# Patient Record
(demographics unavailable — no encounter records)

---

## 2018-01-01 NOTE — PCM.DS
Discharge Summary


Date of Admission: 


18 18:16





Admitting Physician: 


YOSEPH MONTERO





Primary Care Provider: 


YOSEPH MONTERO








Hospital Summary





- Hospital Course


Hospital Course: 





Pt born to  mom at term, , meconium stained fluid and D-Meliton suctioned at 

birth but no further complications. Mom's pregnancy late to care and opiate use 

disorder.  Positive for amphetamines at hospital admission. Baby's meconium 

screen is pending.  CPS is involved.  Baby eating and urinating well; no 

appreciable weight loss.





- Vitals & Intake/Output


Vital Signs: 





 Vital Signs











Temperature  98.6 F   18 01:08


 


Pulse Rate  120 L  18 01:08


 


Respiratory Rate  81   18 01:08


 


Blood Pressure  67/26   18 00:00


 


O2 Sat by Pulse Oximetry      











Intake & Output: 





 Intake & Output











 18





 11:59 11:59 11:59 11:59


 


Weight   3.365 kg 3.374 kg














Discharge Exam


General Appearance: other (cries appropriately during exam)


Neurologic Exam: alert, other (ant font normotensive.)


Skin Exam: warm, dry, rash (scattered macular erythema on chest)


Neck Exam: normal inspection


Respiratory Exam: normal breath sounds, lungs clear, No crackles/rales, No 

rhonchi, No wheezing


Cardiovascular Exam: regular rate/rhythm, normal heart sounds, normal 

peripheral pulses, No murmur


Gastrointestinal/Abdomen Exam: soft, No distention, No mass


Extremity Exam: normal inspection


Male Genitalia Exam: normal genitalia





Final Diagnosis/Problem List





- Final Discharge Diagnosis/Problem


(1) 


Current Visit: Yes   Status: Acute   


Assessment & Plan: 


Doing great, some mild  skin changes.  Watch for withdrawal. Baby to be 

discharged to [facility], I understand.  F/u with me in 1 wk. 








- Discharge


Disposition: Home, Self-Care


Condition: Stable


Prescriptions: 


No Action


   No Reportable Medications [No Reported Medications] 


Follow up with: 


YOSEPH MONTERO [Primary Care Provider] - 1 Week

## 2018-01-01 NOTE — XRAY
Indication: Recent trauma.  Status post pyloric stenosis surgery.



Comparison: None



KUB demonstrates nonspecific moderate air distended stomach and mild air

distended bowel loops without obstruction or free air.  Solid organs, osseous

structures, and lung bases unremarkable.

## 2018-01-01 NOTE — XRAY
Indication: Abdominal distention.  Trauma.  Status post gastric pylorus

surgery.



Two-dimensional abdominal sonogram performed.



Comparison: None



Normal opening gastric pylorus.  No free fluid.  Gallbladder and pancreas not

well-seen due to overlying bowel gas.  Visualized portions of the liver and

spleen appear homogeneous in echogenicity without organomegaly.  Right kidney

measures 4.9 x 2.3 x 2.5 cm and the left measures a 4.8 x 2.2 x 2.1 cm and

appears sonographically normal.



Impression: Gallbladder and pancreas not seen due to overlying bowel gas.

Normal opening gastric pylorus.  Remaining abdominal sonogram is negative.

## 2018-01-01 NOTE — ERPHSYRPT
- History of Present Illness


Time Seen by Provider: 03/20/18 13:06


Source: family ([facility] great-grandmother)


Patient Subjective Stated Complaint: grandmother states she tripped over a 

chair and fell on baby.  kait had recent abd surgery and grandmother is afraid 

she has injured lindy abd.


Triage Nursing Assessment: carried to room per grandmother.  acting appropriate 

for age, crying when examined.  skin w/d, color normal, resp normal for age.  

abd soft.  umbilicus protruding from surgery for hernia and pyloric stenosis.  

grandmother states this in normal but thinks abd is larger than normal.


Physician History: 





CC: fall


Hx: 1 month old infant with hx of neurofibromatosis in the care of great 

grandmother. Child had laparoscopic pyloromyotomy last week at [facility]. 

He has been doing well. A little fussy. Grandma was carrying him PTA and she 

fell. Afraid she hurt his abdomen. He has been in usual state. No vomiting. Not 

passing blood. Calms. Has normal wet diaper on arrival.


Allergies/Adverse Reactions: 








No Known Drug Allergies Allergy (Unverified 03/20/18 13:39)


 





Home Medications: 








No Reportable Medications [No Reported Medications]  02/18/18 [History]





Hx Tetanus, Diphtheria Vaccination/Date Given: No


Hx Influenza Vaccination/Date Given: No


Hx Pneumococcal Vaccination/Date Given: No





- Review of Systems


Constitutional: No Fever


Eyes: No Discharge


Respiratory: No Dyspnea


Abdominal/Gastrointestinal: No Vomiting, No Diarrhea


Skin: No Rash


Neurological: No Paralysis, No Seizure


All Other Systems: Reviewed and Negative





- Past Medical History


Pertinent Past Medical History: Yes


Other Medical History: pyloric stenosis, umbilical hernia, neurofibromatosis





- Past Surgical History


Past Surgical History: Yes


Other Surgical History: hernia repair, pyloric stenosis





- Social History


Smoking Status: Never smoker


Exposure to second hand smoke: No


Drug Use: none


Patient Lives Alone: No





- Nursing Vital Signs


Nursing Vital Signs: 


 Initial Vital Signs











Temperature  98.9 F   03/20/18 13:08


 


Pulse Rate  143   03/20/18 13:08


 


Respiratory Rate  34   03/20/18 13:08


 


Blood Pressure  99/51   03/20/18 13:08


 


O2 Sat by Pulse Oximetry  99   03/20/18 13:08








 Pain Scale











Pain Intensity                 0

















- Physical Exam


General Appearance: active, non-toxic


Head, Eyes, Nose, & Throat Exam: head inspection normal, PERRL, pharynx normal


Ear Exam: bilateral ear: TM normal


Neck Exam: normal inspection, non-tender, supple


Respiratory Exam: normal breath sounds, lungs clear


Cardiovascular Exam: regular rate/rhythm


Gastrointestinal Exam: soft, No tenderness, No distention, No guarding


Genital/Rectal Exam: normal genital exam, No tenderness


Neurologic Exam: alert


Skin Exam: warm, dry, other (well perfused), No rash, No ecchymosis


**SpO2 Interpretation**: normal


Spo2: 99


Oxygen Delivery: Room Air





- Course


Nursing assessment & vital signs reviewed: Yes


Ordered Tests: 


 Active Orders 24 hr











 Category Date Time Status


 


 KUB Stat Exams  03/20/18 13:26 Completed


 


 UPPER ABDOMEN [US] Routine Exams  03/20/18 14:06 Completed








Medication Summary











Generic Name Dose Route Start Last Admin





  Trade Name Freq  PRN Reason Stop Dose Admin


 


Oral Electrolytes  1,000 ml  03/20/18 15:15  03/20/18 15:09





  Pedialyte***  PO  04/19/18 15:14  1,000 ml





  UD IZZY   Administration














- Progress


Progress Note: 





03/20/18 14:01


Radiology will do KUB and complete abdominal songram to rule out free fluid or 

sign of solid organ damage.





03/20/18 15:03


KUB: KUB demonstrates nonspecific moderate air distended stomach and mild air 

distended bowel loops


without obstruction or free air. Solid organs, osseous structures, and lung 

bases unremarkable.





Abd sonogram: Normal opening gastric pylorus. No free fluid. Gallbladder and 

pancreas not well-seen due to


overlying bowel gas. Visualized portions of the liver and spleen appear 

homogeneous in


echogenicity without organomegaly. Right kidney measures 4.9 x 2.3 x 2.5 cm and 

the left


measures a 4.8 x 2.2 x 2.1 cm and appears sonographically normal.


Impression: Gallbladder and pancreas not seen due to overlying bowel gas. 

Normal opening gastric


pylorus. Remaining abdominal sonogram is negative.





Child is stable. Vitals stable. Abd soft on recheck exam. No vomiting. No 

dyspnea. No bruising has developed. Will feed pedialyte and recheck.





03/20/18 16:03


He is stable with a period of observation. Spoke to Dr [facility] at [place]. Will release with instr. Will arrange follow up with Dr Ramírez tomorrow.


Counseled pt/family regarding: diagnosis, need for follow-up, rad results





- Departure


Time of Disposition: 16:03


Departure Disposition: Home


Clinical Impression: 


 fall in grandmother arms





Condition: Stable


Critical Care Time: No


Referrals: 


YOSEPH RAMÍREZ [Primary Care Provider] - 


Additional Instructions: 


See Dr Ramírez tomorrow at 9AM.


Normal feeds.


Return to ER for excessive crying, difficulty breathing, passing blood, 

abdominal distention, or concerns.